# Patient Record
(demographics unavailable — no encounter records)

---

## 2024-10-30 NOTE — HISTORY OF PRESENT ILLNESS
[de-identified] : 11-year-old female comes in today for evaluation of her right ankle pain and injury that occurred yesterday patient had a slip at home and hit the anterior ankle into the sofa.  Accompanied by mom today.

## 2024-10-30 NOTE — IMAGING
[de-identified] : On examination of the right ankle mild anterior ankle swelling is noted with a superficial abrasion.  No surrounding ecchymosis or erythema.  She has tenderness over the anterior distal tibia.  No tenderness over the medial or lateral malleolus.  No tenderness over the ATFL, PTFL or CFL.  No tenderness over the deltoid ligament.  No tenderness over the Achilles or calcaneus.  No tenderness over the metatarsals, no tenderness of the Lisfranc.  No tenderness over the toes.  Pain and restriction through plantarflexion, dorsiflexion, inversion and eversion.  She is ambulating with a limp.  X-ray bilateral ankle for comparison purposes 3 views each AP, lateral, oblique no obvious fracture or dislocation

## 2024-10-30 NOTE — ASSESSMENT
[FreeTextEntry1] : Patient was placed into a tall cam walker boot, weightbearing as tolerated.  Ice, anti-inflammatory as needed.  Rest from gym class and sports.  Follow-up in our pediatric department in a few weeks for repeat evaluation.